# Patient Record
Sex: FEMALE | URBAN - METROPOLITAN AREA
[De-identification: names, ages, dates, MRNs, and addresses within clinical notes are randomized per-mention and may not be internally consistent; named-entity substitution may affect disease eponyms.]

---

## 2017-09-25 ENCOUNTER — OFFICE VISIT (OUTPATIENT)
Dept: PEDIATRICS | Facility: CLINIC | Age: 37
End: 2017-09-25
Payer: COMMERCIAL

## 2017-09-25 VITALS
DIASTOLIC BLOOD PRESSURE: 66 MMHG | HEIGHT: 65 IN | OXYGEN SATURATION: 98 % | TEMPERATURE: 98.1 F | WEIGHT: 131.2 LBS | SYSTOLIC BLOOD PRESSURE: 108 MMHG | BODY MASS INDEX: 21.86 KG/M2 | HEART RATE: 100 BPM

## 2017-09-25 DIAGNOSIS — Z11.3 ROUTINE SCREENING FOR STI (SEXUALLY TRANSMITTED INFECTION): ICD-10-CM

## 2017-09-25 DIAGNOSIS — Z72.0 TOBACCO ABUSE: ICD-10-CM

## 2017-09-25 DIAGNOSIS — N92.0 SPOTTING: ICD-10-CM

## 2017-09-25 DIAGNOSIS — Z00.00 HEALTH CARE MAINTENANCE: Primary | ICD-10-CM

## 2017-09-25 LAB
SPECIMEN SOURCE: NORMAL
WET PREP SPEC: NORMAL

## 2017-09-25 PROCEDURE — 99385 PREV VISIT NEW AGE 18-39: CPT | Performed by: NURSE PRACTITIONER

## 2017-09-25 PROCEDURE — 87210 SMEAR WET MOUNT SALINE/INK: CPT | Performed by: NURSE PRACTITIONER

## 2017-09-25 PROCEDURE — 99213 OFFICE O/P EST LOW 20 MIN: CPT | Mod: 25 | Performed by: NURSE PRACTITIONER

## 2017-09-25 PROCEDURE — G0476 HPV COMBO ASSAY CA SCREEN: HCPCS | Performed by: NURSE PRACTITIONER

## 2017-09-25 PROCEDURE — G0145 SCR C/V CYTO,THINLAYER,RESCR: HCPCS | Performed by: NURSE PRACTITIONER

## 2017-09-25 PROCEDURE — G0124 SCREEN C/V THIN LAYER BY MD: HCPCS | Performed by: NURSE PRACTITIONER

## 2017-09-25 PROCEDURE — 87491 CHLMYD TRACH DNA AMP PROBE: CPT | Performed by: NURSE PRACTITIONER

## 2017-09-25 PROCEDURE — 87591 N.GONORRHOEAE DNA AMP PROB: CPT | Performed by: NURSE PRACTITIONER

## 2017-09-25 NOTE — NURSING NOTE
"Chief Complaint   Patient presents with     Physical     Establish Care       Initial /66  Pulse 100  Temp 98.1  F (36.7  C) (Tympanic)  Ht 5' 5.25\" (1.657 m)  Wt 131 lb 3.2 oz (59.5 kg)  LMP 08/25/2017 (Exact Date)  SpO2 98%  BMI 21.67 kg/m2 Estimated body mass index is 21.67 kg/(m^2) as calculated from the following:    Height as of this encounter: 5' 5.25\" (1.657 m).    Weight as of this encounter: 131 lb 3.2 oz (59.5 kg).  Medication Reconciliation: complete   Karen Covarrubias CMA    "

## 2017-09-25 NOTE — LETTER
October 2, 2017    Kar Bell  [address]   [place] [address]    Dear ,      As we had discussed by phone, enclosed are the results of your recent pap smear and HPV testing, along with the recommendations.     We have recently received your PAP smear result. The result shows ASCUS or Atypical Squamous Cells of Undetermined Significance. This indicates a mild change only    We also tested your sample for the presence of the HPV (Human Papillomavirus). Your sample was positive for HPV types 16 and another high risk type. There are many types of HPV, but we test pap samples for the high risk types. HPV can be the cause of an abnormal Pap smear result.  The high risk types of HPV can also be related to the potential development of cervical cancer if not monitored and/or treated appropriately    Because of this, we need to do further testing. It is recommended that you schedule a colposcopy. Colposcopy is a way for your doctor to use a special magnifying device to look at your vulva, vagina, and cervix. If a problem is seen during colposcopy, a small sample of tissue may be collected for laboratory testing (biopsy). The exam and test will help determine the reason for your abnormal pap smear.    Please call the [facility] at [phone] to schedule this procedure. Schedule this for a time when you are not due to have a period (if having regular menstrual bleeding). You can take an over the counter pain reliever 1 hour before your colposcopy. Nothing in the vagina for 24 hours before your colposcopy (no sex, douches, vaginal medications or lubricants).     If you have additional questions regarding this result, please call the Pap nurse, Mae HAYDEN, at [phone].     Sincerely,  Edna Bernardo, CINTHIA CALERO/Lynette Nissen RN

## 2017-09-25 NOTE — MR AVS SNAPSHOT
After Visit Summary   9/25/2017    Kar Bell    MRN: [id number]           Patient Information     Date Of Birth          1980        Visit Information        Provider Department      9/25/2017 8:20 AM Edna Bernardo APRN [facility]        Today's [facility]     Health care maintenance    -  1    Routine screening for STI (sexually transmitted infection)          Care Instructions    -Come back for blood work on Friday. Schedule appointment.      Preventive Health Recommendations  Female Ages 26 - 39  Yearly exam:   See your health care provider every year in order to    Review health changes.     Discuss preventive care.      Review your medicines if you your doctor has prescribed any.    Until age 30: Get a Pap test every three years (more often if you have had an abnormal result).    After age 30: Talk to your doctor about whether you should have a Pap test every 3 years or have a Pap test with HPV screening every 5 years.   You do not need a Pap test if your uterus was removed (hysterectomy) and you have not had cancer.  You should be tested each year for STDs (sexually transmitted diseases), if you're at risk.   Talk to your provider about how often to have your cholesterol checked.  If you are at risk for diabetes, you should have a diabetes test (fasting glucose).  Shots: Get a flu shot each year. Get a tetanus shot every 10 years.   Nutrition:     Eat at least 5 servings of fruits and vegetables each day.    Eat whole-grain bread, whole-wheat pasta and brown rice instead of white grains and rice.    Talk to your provider about Calcium and Vitamin D.     Lifestyle    Exercise at least 150 minutes a week (30 minutes a day, 5 days of the week). This will help you control your weight and prevent disease.    Limit alcohol to one drink per day.    No smoking.     Wear sunscreen to prevent skin cancer.    See your dentist every six months for an exam and cleaning.          "   Follow-ups after your visit        Future tests that were ordered for you today     Open Future Orders        Priority Expected Expires Ordered    Lipid panel reflex to direct LDL Routine  2018            Who to contact     If you have questions or need follow up information about today's clinic visit or your schedule please contact [facility] [place] directly at [phone].  Normal or non-critical lab and imaging results will be communicated to you by MyChart, letter or phone within 4 business days after the clinic has received the results. If you do not hear from us within 7 days, please contact the clinic through [facility] or phone. If you have a critical or abnormal lab result, we will notify you by phone as soon as possible.  Submit refill requests through [facility] or call your pharmacy and they will forward the refill request to us. Please allow 3 business days for your refill to be completed.          Additional Information About Your Visit        [id number] Information     [facility] lets you send messages to your doctor, view your test results, renew your prescriptions, schedule appointments and more. To sign up, go to [email or web address] . Click on \"Log in\" on the left side of the screen, which will take you to the Welcome page. Then click on \"Sign up Now\" on the right side of the page.     You will be asked to enter the access code listed below, as well as some personal information. Please follow the directions to create your username and password.     Your access code is: [email or web address]  Expires: 2017  8:50 AM     Your access code will  in 90 days. If you need help or a new code, please call your [facility] clinic or [phone].        Care EveryWhere ID     This is your Care EveryWhere ID. This could be used by other organizations to access your [facility] medical records  [id number]        Your Vitals Were     Pulse Temperature Height Last Period Pulse Oximetry BMI (Body " "Mass Index)    100 98.1  F (36.7  C) (Tympanic) 5' 5.25\" (1.657 m) 08/25/2017 (Exact Date) 98% 21.67 kg/m2       Blood Pressure from Last 3 Encounters:   09/25/17 108/66    Weight from Last 3 Encounters:   09/25/17 131 lb 3.2 oz (59.5 kg)              We Performed the Following     CHLAMYDIA TRACHOMATIS PCR     HPV High Risk Types DNA Cervical     NEISSERIA GONORRHOEA PCR     Pap imaged thin layer screen with HPV - recommended age 30 - 65     Wet prep        Primary Care Provider Office Phone # Fax #    Edna Bernardo, CINTHIA [facility] [phone] [address] [place] [address]        Equal Access to Services     ALEC RDZ : Hadii aad ku hadasho Soomaali, waaxda luqadaha, qaybta kaalmada adeegyada, shan mace . So [facility] [phone].    ATENCIÓN: Si habla español, tiene a britton disposición servicios gratuitos de asistencia lingüística. Llame al [phone].    We comply with applicable federal civil rights laws and [place] laws. We do not discriminate on the basis of race, color, national origin, age, disability sex, sexual orientation or gender identity.            Thank you!     Thank you for choosing [facility]  for your care. Our goal is always to provide you with excellent care. Hearing back from our patients is one way we can continue to improve our services. Please take a few minutes to complete the written survey that you may receive in the mail after your visit with us. Thank you!             Your Updated Medication List - Protect others around you: Learn how to safely use, store and throw away your medicines at www.disposemymeds.org.          This list is accurate as of: 9/25/17  8:50 AM.  Always use your most recent med list.                   Brand Name Dispense Instructions for use Diagnosis    BIOTIN PO       Health care maintenance       FOLIC ACID PO      Take 1 mg by mouth daily    Health care maintenance       UNABLE TO " FIND      MEDICATION NAME: fucidin cream 2% - (antibiotic)    Health care maintenance       VITAMIN D PO       Health care maintenance       ALISHA 28 PO       Health care maintenance       ZINC MAGNESIUM ASPARTATE PO       Health care maintenance

## 2017-09-25 NOTE — PROGRESS NOTES
SUBJECTIVE:   CC: Kar Bell is an 37 year old woman who presents for preventive health visit.     Physical   Annual:     Getting at least 3 servings of Calcium per day::  Yes    Bi-annual eye exam::  Yes    Dental care twice a year::  NO    Sleep apnea or symptoms of sleep apnea::  None    Diet::  Regular (no restrictions)    Frequency of exercise::  1 day/week    Duration of exercise::  15-30 minutes    Taking medications regularly::  Yes    Medication side effects::  Not applicable    Additional concerns today::  No  Just quit smoking 3 days ago - smoking since 1996, stopped fpr 2 years when pregnant & breastfeeding  Just broke up with boyfriend, was having spotting for a couple days after intercourse    Previously she tolerated 0.5mg Chantix and it worked, but 1mg gave her dizzy side effects. 0.5 worked in the past. Patch and gum didn't work.     Today's PHQ-2 Score:   PHQ-2 ( 1999 Pfizer) 9/25/2017   Q1: Little interest or pleasure in doing things 1   Q2: Feeling down, depressed or hopeless 1   PHQ-2 Score 2   Q1: Little interest or pleasure in doing things Several days   Q2: Feeling down, depressed or hopeless Several days   PHQ-2 Score 2   Answers for HPI/ROS submitted by the patient on 9/25/2017   PHQ-2 Score: 2    Abuse: Current or Past(Physical, Sexual or Emotional)- No  Do you feel safe in your environment - Yes    Social History   Substance Use Topics     Smoking status: Former Smoker     Smokeless tobacco: Former User     Quit date: 9/22/2017     Alcohol use Yes      Comment: daily - 1 beer     The patient does not drink >3 drinks per day nor >7 drinks per week.    Reviewed orders with patient.  Reviewed health maintenance and updated orders accordingly - Yes  Labs reviewed in EPIC    Mammogram not appropriate for this patient based on age.    Pertinent mammograms are reviewed under the imaging tab.  History of abnormal Pap smear: NO - age 30-65 PAP every 5 years with negative HPV co-testing  "recommended    Reviewed and updated as needed this visit by clinical staff  Tobacco  Meds  Med Hx  Surg Hx  Fam Hx  Soc Hx        Reviewed and updated as needed this visit by Provider              ROS:  C: NEGATIVE for fever, chills, change in weight  I: NEGATIVE for worrisome rashes, moles or lesions  E: NEGATIVE for vision changes or irritation  ENT: NEGATIVE for ear, mouth and throat problems  R: NEGATIVE for significant cough or SOB  B: NEGATIVE for masses, tenderness or discharge  CV: NEGATIVE for chest pain, palpitations or peripheral edema  GI: NEGATIVE for nausea, abdominal pain, heartburn, or change in bowel habits  : NEGATIVE for unusual urinary or vaginal symptoms. Periods are regular.  M: NEGATIVE for significant arthralgias or myalgia  N: NEGATIVE for weakness, dizziness or paresthesias  P: NEGATIVE for changes in mood or affect     OBJECTIVE:   /66  Pulse 100  Temp 98.1  F (36.7  C) (Tympanic)  Ht 5' 5.25\" (1.657 m)  Wt 131 lb 3.2 oz (59.5 kg)  LMP 08/25/2017 (Exact Date)  SpO2 98%  BMI 21.67 kg/m2  EXAM:  GENERAL: healthy, alert and no distress  EYES: Eyes grossly normal to inspection, PERRL and conjunctivae and sclerae normal  HENT: ear canals and TM's normal, nose and mouth without ulcers or lesions  NECK: no adenopathy, no asymmetry, masses, or scars and thyroid normal to palpation  RESP: lungs clear to auscultation - no rales, rhonchi or wheezes  BREAST: normal without masses, tenderness or nipple discharge and no palpable axillary masses or adenopathy  CV: regular rate and rhythm, normal S1 S2, no S3 or S4, no murmur, click or rub, no peripheral edema and peripheral pulses strong  ABDOMEN: soft, nontender, no hepatosplenomegaly, no masses and bowel sounds normal   (female): normal female external genitalia, normal urethral meatus, vaginal mucosa pink, moist, well rugated, and normal cervix/adnexa/uterus without masses or discharge  MS: no gross musculoskeletal defects noted, " "no edema  SKIN: hyperpigmented skin under breasts. no suspicious lesions or rashes  NEURO: Normal strength and tone, mentation intact and speech normal  PSYCH: mentation appears normal, affect normal/bright    ASSESSMENT/PLAN:   1. Health care maintenance  - Pap imaged thin layer screen with HPV - recommended age 30 - 65  - HPV High Risk Types DNA Cervical  - Drospirenone-Ethinyl Estradiol (ALISHA 28 PO);   - UNABLE TO FIND; MEDICATION NAME: fucidin cream 2% - (antibiotic)  - BIOTIN PO;   - Zinc-Magnesium Aspart-Vit B6 (ZINC MAGNESIUM ASPARTATE PO);   - FOLIC ACID PO; Take 1 mg by mouth daily  - Cholecalciferol (VITAMIN D PO);   - Wet prep  - Lipid panel reflex to direct LDL; Future    2. Routine screening for STI (sexually transmitted infection)  - NEISSERIA GONORRHOEA PCR  - CHLAMYDIA TRACHOMATIS PCR    3. Tobacco abuse  Previously tolerated Chantix. Wants to stay at 0.5mg total, had s/es with higher doses,  - varenicline (CHANTIX STARTING MONTH PAK) 0.5 MG X 11 & 1 MG X 42 tablet; Take 0.5 tablets by mouth daily  Dispense: 60 tablet; Refill: 2    (N92.0) Spotting  Comment: No sx of vag infection or STI. Will test for those things as they could cause spotting.   Plan: Wet prep, GONORRHEA/chlamydia.         COUNSELING:  Reviewed preventive health counseling, as reflected in patient instructions         reports that she has quit smoking. She quit smokeless tobacco use 3 days ago.    Estimated body mass index is 21.67 kg/(m^2) as calculated from the following:    Height as of this encounter: 5' 5.25\" (1.657 m).    Weight as of this encounter: 131 lb 3.2 oz (59.5 kg).         Counseling Resources:  ATP IV Guidelines  Pooled Cohorts Equation Calculator  Breast Cancer Risk Calculator  FRAX Risk Assessment  ICSI Preventive Guidelines  Dietary Guidelines for Americans, 2010  USDA's MyPlate  ASA Prophylaxis  Lung CA Screening    CINTHIA Sapp [facility] [place]  "

## 2017-09-25 NOTE — PATIENT INSTRUCTIONS
-Come back for blood work on Friday. Schedule appointment.      Preventive Health Recommendations  Female Ages 26 - 39  Yearly exam:   See your health care provider every year in order to    Review health changes.     Discuss preventive care.      Review your medicines if you your doctor has prescribed any.    Until age 30: Get a Pap test every three years (more often if you have had an abnormal result).    After age 30: Talk to your doctor about whether you should have a Pap test every 3 years or have a Pap test with HPV screening every 5 years.   You do not need a Pap test if your uterus was removed (hysterectomy) and you have not had cancer.  You should be tested each year for STDs (sexually transmitted diseases), if you're at risk.   Talk to your provider about how often to have your cholesterol checked.  If you are at risk for diabetes, you should have a diabetes test (fasting glucose).  Shots: Get a flu shot each year. Get a tetanus shot every 10 years.   Nutrition:     Eat at least 5 servings of fruits and vegetables each day.    Eat whole-grain bread, whole-wheat pasta and brown rice instead of white grains and rice.    Talk to your provider about Calcium and Vitamin D.     Lifestyle    Exercise at least 150 minutes a week (30 minutes a day, 5 days of the week). This will help you control your weight and prevent disease.    Limit alcohol to one drink per day.    No smoking.     Wear sunscreen to prevent skin cancer.    See your dentist every six months for an exam and cleaning.

## 2017-09-26 LAB
C TRACH DNA SPEC QL NAA+PROBE: NEGATIVE
N GONORRHOEA DNA SPEC QL NAA+PROBE: NEGATIVE
SPECIMEN SOURCE: NORMAL
SPECIMEN SOURCE: NORMAL

## 2017-09-27 LAB
COPATH REPORT: ABNORMAL
PAP: ABNORMAL

## 2017-09-28 LAB
FINAL DIAGNOSIS: ABNORMAL
HPV HR 12 DNA CVX QL NAA+PROBE: POSITIVE
HPV16 DNA SPEC QL NAA+PROBE: POSITIVE
HPV18 DNA SPEC QL NAA+PROBE: NEGATIVE
SPECIMEN DESCRIPTION: ABNORMAL

## 2017-09-29 DIAGNOSIS — Z00.00 HEALTH CARE MAINTENANCE: ICD-10-CM

## 2017-09-29 LAB
CHOLEST SERPL-MCNC: 265 MG/DL
HDLC SERPL-MCNC: 88 MG/DL
LDLC SERPL CALC-MCNC: 151 MG/DL
NONHDLC SERPL-MCNC: 177 MG/DL
TRIGL SERPL-MCNC: 128 MG/DL

## 2017-09-29 PROCEDURE — 80061 LIPID PANEL: CPT | Performed by: NURSE PRACTITIONER

## 2017-09-29 PROCEDURE — 36415 COLL VENOUS BLD VENIPUNCTURE: CPT | Performed by: NURSE PRACTITIONER

## 2017-10-02 ENCOUNTER — RESULT FOLLOW UP (OUTPATIENT)
Dept: PEDIATRICS | Facility: CLINIC | Age: 37
End: 2017-10-02

## 2017-10-02 DIAGNOSIS — R87.613 HIGH GRADE SQUAMOUS INTRAEPITHELIAL CERVICAL DYSPLASIA: ICD-10-CM

## 2017-10-02 DIAGNOSIS — R87.810 ASCUS WITH POSITIVE HIGH RISK HPV CERVICAL: ICD-10-CM

## 2017-10-02 DIAGNOSIS — R87.610 ASCUS WITH POSITIVE HIGH RISK HPV CERVICAL: ICD-10-CM

## 2017-10-02 NOTE — LETTER
January 3, 2019      Rollyambar Santiago  [address] [address]    Dear ,      At [facility], your health and wellness is our primary concern. That is why we are following up on a positive high risk HPV test from 11/02/18. Your provider had recommended that you have a Colposcopy  completed by 02/02/19. Our records do not show that this has been scheduled.    It is important to complete the follow up that your provider has suggested for you to ensure that there are no worsening changes which may, over time, develop into cancer.      Please contact our office at  [phone] to schedule an appointment for a Colposcopy (this cannot be scheduled through [facility]) at your earliest convenience. If you have questions or concerns, please call the clinic and we will be happy to assist you.    If you have completed the tests outside of [facility], please have the results forwarded to our office. We will update the chart for your primary Physician to review before your next annual physical.     Thank you for choosing [facility]!    Sincerely,      Edna Bernardo, CINTHIA CNP/esh

## 2017-10-02 NOTE — LETTER
April 19, 2019      Kar Santiago  [address] [address] [address]    Dear MsChasityJack,      At [facility], your health and wellness is our primary concern. That is why we are following up on a positive high risk HPV test from 11/02/18. Your provider had recommended that you have a Colposcopy  completed by 02/02/19. Our records do not show that this has been done or scheduled.      If you have chosen not to do the recommended colposcopy, please contact our office at [phone] to schedule an appointment for a repeat PAP smear and HPV test at your earliest convenience.    If you have completed the tests outside of [facility], please have the results forwarded to our office. We will update the chart for your primary Physician to review before your next annual physical.     Thank you for choosing [facility]!    Sincerely,      Your [facility] Care Team/[facility]

## 2017-10-02 NOTE — LETTER
April 19, 2019      Kar Santiago  [address] [address] [address]    Dear ,    We spoke on the phone today and I could hear that you were emotional. I wanted to let you know that I am sorry for what is troubling you.    I also wanted to follow up with you to give you the [facility] program information, as you had mentioned that you are not able to afford the colposcopy at this time because you are without insurance. This program financially assists women in need of Pap smears, colposcopies, mammograms, etc. who are underinsured or without insurance. I have enclosed one of their brochures for you.    If you have any further needs or questions, please don't hesitate to contact us. Thank you for your time.      Sincerely,    Angie Mandujano  Pap Tracking  - [phone]

## 2017-10-02 NOTE — PROGRESS NOTES
"9/25/17 ASCUS, +HPV 16 & other HR type. Plan colp by 12/25/17  10/2/17 Patient notified of results/recommendations. See result notes.   10/03/17 Result letter printed and sent at request of RN. 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 Plan: pap in 3 months. If still LSIL, then recommend colpo.   02/13/18 Pt notified ([facility])  4/27/18 Dx NIL pap, + HR HPV (not 16/18). ECC: ASCUS. Plan cotest in 6 months ([facility]/lks)  11/2/18 Dx NIL pap, + HR HPV (not 16/18). There are 2 results listed for pap/HPV - Plan colp ([facility])  11/12/18 Pt notified - she is hesitant to follow up with colp again. Wants \"permanent solution\". Dr. Becerra agrees with colp as next step then may discuss further ([facility]) // 11/19/18 LM for pt to call back and touch base about her thoughts ([facility]) // 11/21/18 Spoke with pt. She agrees to proceed with colp ([facility])  01/03/19 [place] reminder letter sent. ([facility])  02/04/19 3mo colp not done, chart updated for 6mo colp/pap. Routed to RN. ([facility])  02/05/19 6 month FYI sent to provider ([facility])  04/19/19 [place]/pap reminder letter sent. ([facility])  05/10/19 Spoke with pt, states she does not currently have insurance and cannot afford the colposcopy. Before I could offer the option of the repeat cotest and/or [facility] program info, pt started to cry and stated it was not a good time to talk. I sent a letter with [facility] program brochure enclosed. ([facility])  06/07/19 Patient is lost to pap tracking follow-up. FYI routed to provider. ([facility])    "

## 2017-10-04 ENCOUNTER — OFFICE VISIT (OUTPATIENT)
Dept: OBGYN | Facility: CLINIC | Age: 37
End: 2017-10-04
Payer: COMMERCIAL

## 2017-10-04 VITALS — DIASTOLIC BLOOD PRESSURE: 68 MMHG | SYSTOLIC BLOOD PRESSURE: 112 MMHG | HEART RATE: 84 BPM

## 2017-10-04 DIAGNOSIS — R87.610 ASCUS WITH POSITIVE HIGH RISK HPV CERVICAL: Primary | ICD-10-CM

## 2017-10-04 DIAGNOSIS — R87.810 ASCUS WITH POSITIVE HIGH RISK HPV CERVICAL: Primary | ICD-10-CM

## 2017-10-04 PROCEDURE — 88305 TISSUE EXAM BY PATHOLOGIST: CPT | Mod: 59 | Performed by: OBSTETRICS & GYNECOLOGY

## 2017-10-04 PROCEDURE — 00000155 ZZHCL STATISTIC H-CELL BLOCK W/STAIN: Performed by: OBSTETRICS & GYNECOLOGY

## 2017-10-04 PROCEDURE — 88112 CYTOPATH CELL ENHANCE TECH: CPT | Performed by: OBSTETRICS & GYNECOLOGY

## 2017-10-04 PROCEDURE — 88305 TISSUE EXAM BY PATHOLOGIST: CPT | Performed by: OBSTETRICS & GYNECOLOGY

## 2017-10-04 PROCEDURE — 57454 BX/CURETT OF CERVIX W/SCOPE: CPT | Performed by: OBSTETRICS & GYNECOLOGY

## 2017-10-04 NOTE — PROGRESS NOTES
Kar Bell is a 37 year old female who presents for initial colposcopy, referred by Dr. Bernardo. Pap smear shows ASCUS with HR HPV      -no previous colposcopy or treatment for dysplasia    Past Medical History:   Diagnosis Date     ASCUS with positive high risk HPV cervical 09/25/2017    ASCUS, +HPV 16 & other HR type     Family History   Problem Relation Age of Onset     Other Cancer Father      started with kidney then spread everywhere     DIABETES Father      Coronary Artery Disease Father      Hypertension Father      Hyperlipidemia Father      Coronary Artery Disease Maternal Grandfather      Breast Cancer Paternal Grandmother        Previous history of abnormal paps?: No  History of cryotherapy (freezing)?: : No  History of LEEP: : No     Patient's last menstrual period was 09/26/2017 (exact date).      History   Smoking Status     Former Smoker   Smokeless Tobacco     Former User     Quit date: 9/22/2017       Allergies as of 10/04/2017     (No Known Allergies)       PROCEDURE:  Before the procedure, it was ensured that the patient was educated regarding the nature of her findings to date, the implications of them, and what was to be done. She has been made aware of the role of HPV, the natural history of infection, ways to minimize her future risk, the effect of HPV on the cervix, and treatment options available should they be indicated. The pathophysiology of the cervix, including a discussion of squamous vs. endometrial cells, and squamous metaplasia have all been reviewed, using illustrations and sketches. The details of the colposcopic procedure were reviewed, as well as the risks of missed diagnoses, pain, infection and bleeding. All questions were answered before proceeding, and informed consent was therefore obtained.      Pap repeated?:  No  SCJ seen?:  yes  Endocervical speculum needed?:  No  ECC done?:  No  EndoPap done?:  YES  Lugol's solution used?:  No  Satisfactory examination?:   yes    Vaginal vault: normal to cursory inspection   Urethra normal?:  yes  Labia normal?:  yes  Perineum normal?:  yes  Rectum normal?:  yes    FINDINGS:  Please see image   Cervix: acetowhite area from 5:00 to 7:00  Procedure: biopsies taken (not including ECC): 1.    Procedure Note:     Lighted speculum used to visualize cervix     Cervix painted with vinegar     Aceto-white area noted from 5 to 7 o'clock        -endoPap done        -biopsy of cervix with Kevorkian biopsy forceps done at 6 o'clock     Nacho singh; applied to biopsy site  All instruments removed from vagina    Assessment: atypia vs LGSIL      Plan: await biopsy(s)

## 2017-10-04 NOTE — NURSING NOTE
"  Chief Complaint   Patient presents with     Colposcopy     last pap 09/25/17 - ascus - HPV HR and 16 DNA       Initial /68 (BP Location: Right arm, Patient Position: Chair, Cuff Size: Adult Regular)  Pulse 84  LMP 09/26/2017 (Exact Date) Estimated body mass index is 21.67 kg/(m^2) as calculated from the following:    Height as of 9/25/17: 5' 5.25\" (1.657 m).    Weight as of 9/25/17: 131 lb 3.2 oz (59.5 kg).  Medication Reconciliation: complete     Nurse assisted visit.  Kimberlyn Jameson MA.      "

## 2017-10-04 NOTE — LETTER
[facility]  [address]  [place] [address]  [phone]      October 9, 2017    Kar Bell                                                                                                                                                       [address]   [place] [address]              Dear Kar,          The results of your colposcopy show high-grade squamous intraepithelial lesion (IDA 2-3)  At this point I would recommend LEEP Therapy.  This is a simple office procedure that will usually get rid of the abnormality.      I have enclosed some literature regarding dysplasia and the LEEP procedure.    Your LEEP is scheduled at the [facility], [address], [address]  On 11/3/17 at 10:00.    It is recommended that you take Ibuprofen 600 mg 1 hour prior to your scheduled appointment time.  We will also need to collect a urine sample upon your arrival.    Feel free to call me or my nurse if you have any questions before your appointment.                    Sincerely,  Julian Becerra MD

## 2017-10-06 LAB
COPATH REPORT: NORMAL
COPATH REPORT: NORMAL

## 2017-10-08 NOTE — PROGRESS NOTES
Please contact the patient and asked her to follow-up with Dr. Becerra regarding treatment for this result (ECC pos for CIN1)  Rush Barksdale M.D.

## 2017-11-03 ENCOUNTER — OFFICE VISIT (OUTPATIENT)
Dept: OBGYN | Facility: CLINIC | Age: 37
End: 2017-11-03

## 2017-11-03 VITALS — BODY MASS INDEX: 22.48 KG/M2 | SYSTOLIC BLOOD PRESSURE: 122 MMHG | DIASTOLIC BLOOD PRESSURE: 82 MMHG | WEIGHT: 136.1 LBS

## 2017-11-03 DIAGNOSIS — R87.613 HIGH GRADE SQUAMOUS INTRAEPITHELIAL CERVICAL DYSPLASIA: Primary | ICD-10-CM

## 2017-11-03 PROBLEM — R87.610 ASCUS WITH POSITIVE HIGH RISK HPV CERVICAL: Status: RESOLVED | Noted: 2017-09-25 | Resolved: 2017-11-03

## 2017-11-03 PROBLEM — R87.810 ASCUS WITH POSITIVE HIGH RISK HPV CERVICAL: Status: RESOLVED | Noted: 2017-09-25 | Resolved: 2017-11-03

## 2017-11-03 LAB — HCG, QUAL URINE: NEGATIVE

## 2017-11-03 PROCEDURE — 00000155 ZZHCL STATISTIC H-CELL BLOCK W/STAIN: Performed by: OBSTETRICS & GYNECOLOGY

## 2017-11-03 PROCEDURE — 88112 CYTOPATH CELL ENHANCE TECH: CPT | Performed by: OBSTETRICS & GYNECOLOGY

## 2017-11-03 PROCEDURE — 88342 IMHCHEM/IMCYTCHM 1ST ANTB: CPT | Performed by: OBSTETRICS & GYNECOLOGY

## 2017-11-03 PROCEDURE — 88305 TISSUE EXAM BY PATHOLOGIST: CPT | Performed by: OBSTETRICS & GYNECOLOGY

## 2017-11-03 PROCEDURE — 84703 CHORIONIC GONADOTROPIN ASSAY: CPT | Performed by: OBSTETRICS & GYNECOLOGY

## 2017-11-03 PROCEDURE — 88307 TISSUE EXAM BY PATHOLOGIST: CPT | Performed by: OBSTETRICS & GYNECOLOGY

## 2017-11-03 PROCEDURE — 57520 CONIZATION OF CERVIX: CPT | Performed by: OBSTETRICS & GYNECOLOGY

## 2017-11-03 NOTE — NURSING NOTE
"Chief Complaint   Patient presents with     Devon Tavares MA      Initial /82 (BP Location: Left arm, Patient Position: Chair, Cuff Size: Adult Regular)  Wt 136 lb 1.6 oz (61.7 kg)  LMP 10/26/2017  BMI 22.48 kg/m2 Estimated body mass index is 22.48 kg/(m^2) as calculated from the following:    Height as of 9/25/17: 5' 5.25\" (1.657 m).    Weight as of this encounter: 136 lb 1.6 oz (61.7 kg).  Medication Reconciliation: complete    "

## 2017-11-03 NOTE — PROGRESS NOTES
SUBJECTIVE: Kra Bell is a 37 year old female single female. OB Patient's last menstrual period was 10/26/2017..  She is here for LEEP. The details of LEEP were reviewed, as well as the risks of pain, infection and bleeding. Risks also include risk of uterine perforation, PID and possible pregnancy and risk of failure to remove all abnormality and/or recurrence of condition.  All questions were answered before proceeding, and informed consent was therefore obtained.    No Known Allergies  Current Outpatient Prescriptions   Medication Sig Dispense Refill     UNABLE TO FIND MEDICATION NAME: fucidin cream 2% - (antibiotic)       BIOTIN PO        Zinc-Magnesium Aspart-Vit B6 (ZINC MAGNESIUM ASPARTATE PO)        FOLIC ACID PO Take 1 mg by mouth daily       Cholecalciferol (VITAMIN D PO)        varenicline (CHANTIX STARTING MONTH PAK) 0.5 MG X 11 & 1 MG X 42 tablet Take 0.5 tablets by mouth daily 60 tablet 2      Past Medical History:   Diagnosis Date     ASCUS with positive high risk HPV cervical 09/25/2017    ASCUS, +HPV 16 & other HR type     Family History   Problem Relation Age of Onset     Other Cancer Father      started with kidney then spread everywhere     DIABETES Father      Coronary Artery Disease Father      Hypertension Father      Hyperlipidemia Father      Coronary Artery Disease Maternal Grandfather      Breast Cancer Paternal Grandmother       Social History     Social History     Marital status: Single     Spouse name: N/A     Number of children: N/A     Years of education: N/A     Occupational History     Not on file.     Social History Main Topics     Smoking status: Former Smoker     Smokeless tobacco: Former User     Quit date: 9/22/2017     Alcohol use Yes      Comment: daily - 1 beer     Drug use: No     Sexual activity: Yes     Partners: Male     Birth control/ protection: Pill     Other Topics Concern     Not on file     Social History Narrative     Past Surgical History:   Procedure Laterality  Date      SECTION             EXAM:  /82 (BP Location: Left arm, Patient Position: Chair, Cuff Size: Adult Regular)  Wt 136 lb 1.6 oz (61.7 kg)  LMP 10/26/2017  BMI 22.48 kg/m2  Abdomen: soft, nontender, without hepatosplenomegaly or masses  : normal cervix, adnexae, and uterus without masses or discharge      ASSESSMENT/PLAN:  (R87.613) High grade squamous intraepithelial cervical dysplasia  (primary encounter diagnosis)  LEEP    Procedure:        cervix visualized with coated speculum       anesthetized with 15 cc 2% lidocaine with epinephrine       anterior superficial biopsy removed and submitted to pathology       posterior superficial biopsy removed and submitted to pathology       deep biopsy removed and submitted to pathology       endopap done       ball cautery to base       Monsel's solution to base      Follow up appointment in two weeks.

## 2017-11-07 LAB
COPATH REPORT: NORMAL
COPATH REPORT: NORMAL

## 2017-11-17 ENCOUNTER — OFFICE VISIT (OUTPATIENT)
Dept: OBGYN | Facility: CLINIC | Age: 37
End: 2017-11-17

## 2017-11-17 VITALS — WEIGHT: 135.3 LBS | BODY MASS INDEX: 22.34 KG/M2 | SYSTOLIC BLOOD PRESSURE: 98 MMHG | DIASTOLIC BLOOD PRESSURE: 60 MMHG

## 2017-11-17 DIAGNOSIS — Z98.890 POSTOPERATIVE STATE: Primary | ICD-10-CM

## 2017-11-17 PROCEDURE — 99024 POSTOP FOLLOW-UP VISIT: CPT | Performed by: OBSTETRICS & GYNECOLOGY

## 2017-11-17 NOTE — PROGRESS NOTES
Here for postop check post LEEP      -LEEP therapy two weeks ago      -no significant bleeding until 2 days ago  Menses due 11/22/17    Pathology reviewed     LGSIL, no HGSIL margins free    O: cervix;         No bleeding site    A: bleeding; suspect uterine  P: return in 3 months first pap

## 2017-11-17 NOTE — NURSING NOTE
"Chief Complaint   Patient presents with     RECHECK     LEEP   2 week follow up.  States bright red bleeding.  Kym Tavares MA      Initial BP 98/60 (BP Location: Right arm, Patient Position: Chair, Cuff Size: Adult Regular)  Wt 135 lb 4.8 oz (61.4 kg)  LMP 11/13/2017  BMI 22.34 kg/m2 Estimated body mass index is 22.34 kg/(m^2) as calculated from the following:    Height as of 9/25/17: 5' 5.25\" (1.657 m).    Weight as of this encounter: 135 lb 4.8 oz (61.4 kg).  Medication Reconciliation: complete    "

## 2017-11-17 NOTE — MR AVS SNAPSHOT
"              After Visit Summary   11/17/2017    Kar Bell    MRN: [id number]           Patient Information     Date Of Birth          1980        Visit Information        Provider Department      11/17/2017 3:15 PM Julian Becerra MD [facility]        Today's Diagnoses     Postoperative state    -  1       Follow-ups after your visit        Your next 10 appointments already scheduled     Feb 02, 2018  9:45 AM CST   Office Visit with Julian Becerra MD   [facility] ([facility])    [address]  [facility] [id number] [phone]           Bring a current list of meds and any records pertaining to this visit. For Physicals, please bring immunization records and any forms needing to be filled out. Please arrive 10 minutes early to complete paperwork.              Who to contact     If you have questions or need follow up information about today's clinic visit or your schedule please contact [facility] directly at [phone].  Normal or non-critical lab and imaging results will be communicated to you by Mail'Insidehart, letter or phone within 4 business days after the clinic has received the results. If you do not hear from us within 7 days, please contact the clinic through Smart[employer]oc[employer] or phone. If you have a critical or abnormal lab result, we will notify you by phone as soon as possible.  Submit refill requests through [employer] or call your pharmacy and they will forward the refill request to us. Please allow 3 business days for your refill to be completed.          Additional Information About Your Visit        Mail'Insidehart Information     [employer] lets you send messages to your doctor, view your test results, renew your prescriptions, schedule appointments and more. To sign up, go to [email or web address] . Click on \"Log in\" on the left side of the screen, which will take you to the Welcome page. Then click on \"Sign up Now\" on the " right side of the page.     You will be asked to enter the access code listed below, as well as some personal information. Please follow the directions to create your username and password.     Your access code is: [email or web address]  Expires: 2017  7:50 AM     Your access code will  in 90 days. If you need help or a new code, please call your [place] clinic or [phone].        Care EveryWhere ID     This is your Care EveryWhere ID. This could be used by other organizations to access your [place] medical records  [id number]        Your Vitals Were     Last Period BMI (Body Mass Index)                2017 22.34 kg/m2           Blood Pressure from Last 3 Encounters:   17 98/60   17 122/82   10/04/17 112/68    Weight from Last 3 Encounters:   17 135 lb 4.8 oz (61.4 kg)   17 136 lb 1.6 oz (61.7 kg)   17 131 lb 3.2 oz (59.5 kg)              Today, you had the following     No orders found for display       Primary Care Provider Office Phone # Fax #    Edna Anca Bernardo, CINTHIA [facility] [phone]       [address] [place] [address]        Equal Access to Services     [facility] AH: Hadii aad ku hadasho Soomaali, waaxda luqadaha, qaybta kaalmada adeegyada, waxay idiin hayaan jacob mace . So [facility] [phone].    ATENCIÓN: Si habla español, tiene a britton disposición servicios gratuitos de asistencia lingüística. Llame al [phone].    We comply with applicable federal civil rights laws and [place] laws. We do not discriminate on the basis of race, color, national origin, age, disability, sex, sexual orientation, or gender identity.            Thank you!     Thank you for choosing [facility]  for your care. Our goal is always to provide you with excellent care. Hearing back from our patients is one way we can continue to improve our services. Please take a few minutes to complete the written survey that you may receive  in the mail after your visit with us. Thank you!             Your Updated Medication List - Protect others around you: Learn how to safely use, store and throw away your medicines at www.disposemymeds.org.          This list is accurate as of: 11/17/17  4:00 PM.  Always use your most recent med list.                   Brand Name Dispense Instructions for use Diagnosis    BIOTIN PO       Health care maintenance       FOLIC ACID PO      Take 1 mg by mouth daily    Health care maintenance       UNABLE TO FIND      MEDICATION NAME: fucidin cream 2% - (antibiotic)    Health care maintenance       varenicline 0.5 MG X 11 & 1 MG X 42 tablet    CHANTIX STARTING MONTH RANDY    60 tablet    Take 0.5 tablets by mouth daily    Tobacco abuse       VITAMIN D PO       Health care maintenance       ZINC MAGNESIUM ASPARTATE PO       Health care maintenance

## 2018-02-02 ENCOUNTER — OFFICE VISIT (OUTPATIENT)
Dept: OBGYN | Facility: CLINIC | Age: 38
End: 2018-02-02

## 2018-02-02 VITALS — DIASTOLIC BLOOD PRESSURE: 70 MMHG | SYSTOLIC BLOOD PRESSURE: 108 MMHG | BODY MASS INDEX: 22.46 KG/M2 | WEIGHT: 136 LBS

## 2018-02-02 DIAGNOSIS — R87.613 HIGH GRADE SQUAMOUS INTRAEPITHELIAL CERVICAL DYSPLASIA: ICD-10-CM

## 2018-02-02 DIAGNOSIS — Z12.4 ENCOUNTER FOR SCREENING FOR CERVICAL CANCER: Primary | ICD-10-CM

## 2018-02-02 PROCEDURE — 88141 CYTOPATH C/V INTERPRET: CPT | Performed by: OBSTETRICS & GYNECOLOGY

## 2018-02-02 PROCEDURE — 88175 CYTOPATH C/V AUTO FLUID REDO: CPT | Performed by: OBSTETRICS & GYNECOLOGY

## 2018-02-02 PROCEDURE — 99213 OFFICE O/P EST LOW 20 MIN: CPT | Performed by: OBSTETRICS & GYNECOLOGY

## 2018-02-02 PROCEDURE — 87624 HPV HI-RISK TYP POOLED RSLT: CPT | Performed by: OBSTETRICS & GYNECOLOGY

## 2018-02-02 NOTE — MR AVS SNAPSHOT
"              After Visit Summary   2018    Kar Bell    MRN: [id number]           Patient Information     Date Of Birth          1980        Visit Information        Provider Department      2018 9:45 AM Julian Becerra MD [facility]        Today's Diagnoses     Encounter for screening for cervical cancer     -  1    High grade squamous intraepithelial cervical dysplasia           Follow-ups after your visit        Who to contact     If you have questions or need follow up information about today's clinic visit or your schedule please contact [facility] directly at [phone].  Normal or non-critical lab and imaging results will be communicated to you by [employer]hart, letter or phone within 4 business days after the clinic has received the results. If you do not hear from us within 7 days, please contact the clinic through [employer] or phone. If you have a critical or abnormal lab result, we will notify you by phone as soon as possible.  Submit refill requests through [employer] or call your pharmacy and they will forward the refill request to us. Please allow 3 business days for your refill to be completed.          Additional Information About Your Visit        MyChart Information     [employer] lets you send messages to your doctor, view your test results, renew your prescriptions, schedule appointments and more. To sign up, go to [email or web address] . Click on \"Log in\" on the left side of the screen, which will take you to the Welcome page. Then click on \"Sign up Now\" on the right side of the page.     You will be asked to enter the access code listed below, as well as some personal information. Please follow the directions to create your username and password.     Your access code is: [email or web address]  Expires: 5/3/2018 10:52 AM     Your access code will  in 90 days. If you need help or a new code, please call your [facility] or [phone].        Care " EveryWhere ID     This is your Care EveryWhere ID. This could be used by other organizations to access your [place] medical records  [id number]        Your Vitals Were     Last Period BMI (Body Mass Index)                01/10/2018 (Exact Date) 22.46 kg/m2           Blood Pressure from Last 3 Encounters:   02/02/18 108/70   11/17/17 98/60   11/03/17 122/82    Weight from Last 3 Encounters:   02/02/18 136 lb (61.7 kg)   11/17/17 135 lb 4.8 oz (61.4 kg)   11/03/17 136 lb 1.6 oz (61.7 kg)              We Performed the Following     HPV High Risk Types DNA Cervical     Pap imaged thin layer diagnostic with HPV (select HPV order below)          Today's Medication Changes          These changes are accurate as of 2/2/18 10:52 AM.  If you have any questions, ask your nurse or doctor.               Stop taking these medicines if you haven't already. Please contact your care team if you have questions.     UNABLE TO FIND   Stopped by:  Julian Becerra MD           varenicline 0.5 MG X 11 & 1 MG X 42 tablet   Commonly known as:  CHANTIX STARTING MONTH PAK   Stopped by:  Julian Becerra MD                    Primary Care Provider Office Phone # Fax #    CINTHIA Sapp [facility] [phone]       [address] [place] [address]        Equal Access to Services     [facility] AH: Hadii aad ku hadasho Soomaali, waaxda luqadaha, qaybta kaalmada shan anderson. So [facility] [phone].    ATENCIÓN: Si habla español, tiene a britton disposición servicios gratuitos de asistencia lingüística. Joaquin al [phone].    We comply with applicable federal civil rights laws and [place] laws. We do not discriminate on the basis of race, color, national origin, age, disability, sex, sexual orientation, or gender identity.            Thank you!     Thank you for choosing [facility]  for your care. Our goal is always to provide you with excellent care.  Hearing back from our patients is one way we can continue to improve our services. Please take a few minutes to complete the written survey that you may receive in the mail after your visit with us. Thank you!             Your Updated Medication List - Protect others around you: Learn how to safely use, store and throw away your medicines at www.disposemymeds.org.          This list is accurate as of 2/2/18 10:52 AM.  Always use your most recent med list.                   Brand Name Dispense Instructions for use Diagnosis    BIOTIN PO       Health care maintenance       FOLIC ACID PO      Take 1 mg by mouth daily    Health care maintenance       VITAMIN D PO       Health care maintenance       ZINC MAGNESIUM ASPARTATE PO       Health care maintenance

## 2018-02-02 NOTE — NURSING NOTE
"Chief Complaint   Patient presents with     Repeat Pap Smear     Leep done 11/3/17       Initial /70 (BP Location: Right arm, Patient Position: Chair, Cuff Size: Adult Regular)  Wt 136 lb (61.7 kg)  LMP 01/10/2018 (Exact Date)  BMI 22.46 kg/m2 Estimated body mass index is 22.46 kg/(m^2) as calculated from the following:    Height as of 9/25/17: 5' 5.25\" (1.657 m).    Weight as of this encounter: 136 lb (61.7 kg).  Medication Reconciliation: complete     Talita Epps CMA      "

## 2018-02-02 NOTE — PROGRESS NOTES
S:   Kar Bell presents today for a first pap smear s/p LEEP           LEEP therapy for HGSIL     O:   Abdomen:    Abdomen soft, non-tender. BS normal. No masses, organomegaly           Pelvic:  normal vagina and vulva, normal cervix without lesions or tenderness, uterus normal size anteverted, adenxa normal in size without tenderness, pap smear done, exam chaperoned by nurse         Pap smear with autocyte brush and reflex HPV typing done    A:   PAP SMEAR,   repeat after therapy        HGSIL; treated with LEEP    P:   The patient wll return in 6 months is this pap smear is normal,  or sooner as              needed.

## 2018-02-06 LAB
COPATH REPORT: ABNORMAL
PAP: ABNORMAL

## 2018-02-08 LAB
FINAL DIAGNOSIS: ABNORMAL
HPV HR 12 DNA CVX QL NAA+PROBE: POSITIVE
HPV16 DNA SPEC QL NAA+PROBE: NEGATIVE
HPV18 DNA SPEC QL NAA+PROBE: NEGATIVE
SPECIMEN DESCRIPTION: ABNORMAL
SPECIMEN SOURCE CVX/VAG CYTO: ABNORMAL

## 2018-02-09 PROBLEM — R87.613 HIGH GRADE SQUAMOUS INTRAEPITHELIAL CERVICAL DYSPLASIA: Status: ACTIVE | Noted: 2017-11-03

## 2018-04-02 ENCOUNTER — TELEPHONE (OUTPATIENT)
Dept: PEDIATRICS | Facility: CLINIC | Age: 38
End: 2018-04-02

## 2018-04-02 NOTE — TELEPHONE ENCOUNTER
Patient is calling because she wonders if she has been tested for herpes-and she has not.  She is asking to be tested for herpes.  She is not symptomatic.  However, her  has lesions on his penis and now in his mouth, and he thinks he may have herpes.  States he has never been tested in the past, and doesn't know about exposure, but thinks this is herpes.  Advised her that he should be seen while having an outbreak.    Is a serum test going to provide reliable data for patient?  Advised her not to have sexual intercourse while her partner is experiencing an outbreak.  Encouraged her to have her  schedule an appointment.  He doesn't have a primary provider.  She will discuss with him.  KEMAL Quach RN

## 2018-04-03 NOTE — TELEPHONE ENCOUNTER
Patient notified of information below  She will speak with her  and encourage him to schedule an appointment.  No further questions.  Will call back if any other questions or concerns.  KEMAL Quach RN

## 2018-04-27 ENCOUNTER — OFFICE VISIT (OUTPATIENT)
Dept: OBGYN | Facility: CLINIC | Age: 38
End: 2018-04-27

## 2018-04-27 VITALS — DIASTOLIC BLOOD PRESSURE: 68 MMHG | WEIGHT: 134 LBS | BODY MASS INDEX: 22.13 KG/M2 | SYSTOLIC BLOOD PRESSURE: 106 MMHG

## 2018-04-27 DIAGNOSIS — R87.613 HIGH GRADE SQUAMOUS INTRAEPITHELIAL CERVICAL DYSPLASIA: ICD-10-CM

## 2018-04-27 PROCEDURE — 00000155 ZZHCL STATISTIC H-CELL BLOCK W/STAIN: Performed by: OBSTETRICS & GYNECOLOGY

## 2018-04-27 PROCEDURE — 88175 CYTOPATH C/V AUTO FLUID REDO: CPT | Performed by: OBSTETRICS & GYNECOLOGY

## 2018-04-27 PROCEDURE — 88305 TISSUE EXAM BY PATHOLOGIST: CPT | Performed by: OBSTETRICS & GYNECOLOGY

## 2018-04-27 PROCEDURE — 87624 HPV HI-RISK TYP POOLED RSLT: CPT | Performed by: OBSTETRICS & GYNECOLOGY

## 2018-04-27 PROCEDURE — 88112 CYTOPATH CELL ENHANCE TECH: CPT | Performed by: OBSTETRICS & GYNECOLOGY

## 2018-04-27 PROCEDURE — 99213 OFFICE O/P EST LOW 20 MIN: CPT | Performed by: OBSTETRICS & GYNECOLOGY

## 2018-04-27 NOTE — NURSING NOTE
"Chief Complaint   Patient presents with     Gyn Exam     Diagnostic pap        Initial /68 (BP Location: Left arm, Patient Position: Sitting, Cuff Size: Adult Regular)  Wt 134 lb (60.8 kg)  LMP 2018 (Exact Date)  Breastfeeding? No  BMI 22.13 kg/m2 Estimated body mass index is 22.13 kg/(m^2) as calculated from the following:    Height as of 17: 5' 5.25\" (1.657 m).    Weight as of this encounter: 134 lb (60.8 kg).  BP completed using cuff size: regular        The following HM Due: pap smear    patient has appointment for today.  Jan Covarrubias CMA                 "

## 2018-04-27 NOTE — LETTER
May 4, 2018      Kar Santiago  [address] [place] [address]    Dear ,      This letter is in regards to the PAP smear and HPV (Human Papillomavirus) test you had done recently. Your PAP test result is normal, but your HPV (Human Papillomavirus) test was positive.     About 80 percent of women have been exposed to HPV virus throughout their lifetime. There is no medication for the treatment of HPV. Typically your own immune system gets rid of the virus before it does harm. HPV is spread by direct skin-to-skin contact, including sexual intercourse, oral sex, anal sex, or any other contact involving the genital area (example: hand to genital contact). It is not possible to become infected with HPV by touching an object, such as a toilet seat. Most people who are infected with HPV have no signs or symptoms.    Things that you can do to boost your immune system and help your body get rid of HPV: get plenty of rest, eat a well-balanced diet of healthy foods, and stop smoking.     Please return in 6 months to repeat your pap smear and HPV test.     If you have additional questions regarding this result, please call [phone].    Sincerely,      Julian Becrera MD/kayley

## 2018-04-27 NOTE — MR AVS SNAPSHOT
"              After Visit Summary   2018    Kar Santiago    MRN: [id number]           Patient Information     Date Of Birth          1980        Visit Information        Provider Department      2018 9:00 AM Julian Becerra MD [facility]        Today's Diagnoses     High grade squamous intraepithelial cervical dysplasia           Follow-ups after your visit        Who to contact     If you have questions or need follow up information about today's clinic visit or your schedule please contact [facility] directly at [phone].  Normal or non-critical lab and imaging results will be communicated to you by MyChart, letter or phone within 4 business days after the clinic has received the results. If you do not hear from us within 7 days, please contact the clinic through [employer] or phone. If you have a critical or abnormal lab result, we will notify you by phone as soon as possible.  Submit refill requests through [employer] or call your pharmacy and they will forward the refill request to us. Please allow 3 business days for your refill to be completed.          Additional Information About Your Visit        MyChart Information     [employer] lets you send messages to your doctor, view your test results, renew your prescriptions, schedule appointments and more. To sign up, go to [email or web address] . Click on \"Log in\" on the left side of the screen, which will take you to the Welcome page. Then click on \"Sign up Now\" on the right side of the page.     You will be asked to enter the access code listed below, as well as some personal information. Please follow the directions to create your username and password.     Your access code is: [email or web address]  Expires: 5/3/2018 11:52 AM     Your access code will  in 90 days. If you need help or a new code, please call your [facility] or [phone].        Care EveryWhere ID     This is your Care EveryWhere ID. This " could be used by other organizations to access your [place] medical records  [id number]        Your Vitals Were     Last Period Breastfeeding? BMI (Body Mass Index)             03/20/2018 (Exact Date) No 22.13 kg/m2          Blood Pressure from Last 3 Encounters:   04/27/18 106/68   02/02/18 108/70   11/17/17 98/60    Weight from Last 3 Encounters:   04/27/18 134 lb (60.8 kg)   02/02/18 136 lb (61.7 kg)   11/17/17 135 lb 4.8 oz (61.4 kg)              We Performed the Following     Cytology non gyn     HPV High Risk Types DNA Cervical     Pap imaged thin layer diagnostic with HPV (select HPV order below)        Primary Care Provider Office Phone # Fax #    CINTHIA Sapp [phone] [address] [place] [address]        Equal Access to Services     [facility]: Hadii aad ku hadasho Soomaali, waaxda luqadaha, qaybta kaalmada adeegyada, shan martinezin haykalpana mace . So [facility] [phone].    ATENCIÓN: Si habla español, tiene a britton disposición servicios gratuitos de asistencia lingüística. Llame al [phone].    We comply with applicable federal civil rights laws and [place] laws. We do not discriminate on the basis of race, color, national origin, age, disability, sex, sexual orientation, or gender identity.            Thank you!     Thank you for choosing [facility]  for your care. Our goal is always to provide you with excellent care. Hearing back from our patients is one way we can continue to improve our services. Please take a few minutes to complete the written survey that you may receive in the mail after your visit with us. Thank you!             Your Updated Medication List - Protect others around you: Learn how to safely use, store and throw away your medicines at www.disposemymeds.org.          This list is accurate as of 4/27/18  9:48 AM.  Always use your most recent med list.                   Brand Name Dispense  Instructions for use Diagnosis    BIOTIN PO       Health care maintenance       FOLIC ACID PO      Take 1 mg by mouth daily    Health care maintenance       garlic 150 MG Tabs tablet      Take 150 mg by mouth daily        VITAMIN D PO       Health care maintenance       ZINC MAGNESIUM ASPARTATE PO       Health care maintenance

## 2018-04-27 NOTE — PROGRESS NOTES
S:   Kar Santiago presents today for a repeat pap smear.            -underwent LEEP therapy for IDA 3; first pap LGSIL               O:   Abdomen:    Abdomen soft, non-tender. BS normal. No masses, organomegaly           Pelvic:  normal vagina and vulva, normal cervix without lesions or tenderness, uterus normal size anteverted, adenxa normal in size without tenderness, pap smear done, exam chaperoned by nurse         Pap smear (ectocervix); done         EndoPap; done    A:   PAP SMEAR,   repeat after therapy     P:   Will attempt to localize abnormality with above separate pap smear        Further therapy/monitoring based on above

## 2018-05-01 LAB
COPATH REPORT: NORMAL
COPATH REPORT: NORMAL
PAP: NORMAL

## 2018-05-02 NOTE — PROGRESS NOTES
Please advise the patient of endocervical curetting results showing atypical squamous cells of undetermined significance.  I would like the patient to follow-up with Dr. Becerra to develop further treatment plan  Rush Barksdale MD FACOG

## 2018-11-02 ENCOUNTER — OFFICE VISIT (OUTPATIENT)
Dept: OBGYN | Facility: CLINIC | Age: 38
End: 2018-11-02

## 2018-11-02 VITALS — SYSTOLIC BLOOD PRESSURE: 118 MMHG | BODY MASS INDEX: 23.25 KG/M2 | WEIGHT: 140.8 LBS | DIASTOLIC BLOOD PRESSURE: 76 MMHG

## 2018-11-02 DIAGNOSIS — R87.613 HIGH GRADE SQUAMOUS INTRAEPITHELIAL CERVICAL DYSPLASIA: Primary | ICD-10-CM

## 2018-11-02 PROCEDURE — 99213 OFFICE O/P EST LOW 20 MIN: CPT | Performed by: OBSTETRICS & GYNECOLOGY

## 2018-11-02 PROCEDURE — 87624 HPV HI-RISK TYP POOLED RSLT: CPT | Performed by: OBSTETRICS & GYNECOLOGY

## 2018-11-02 PROCEDURE — 88175 CYTOPATH C/V AUTO FLUID REDO: CPT | Performed by: OBSTETRICS & GYNECOLOGY

## 2018-11-02 RX ORDER — DROSPIRENONE AND ETHINYL ESTRADIOL 0.03MG-3MG
1 KIT ORAL DAILY
COMMUNITY

## 2018-11-02 NOTE — PROGRESS NOTES
S:   Kar Santiago presents today for a repeat pap smear.  This the patient's second pap smear status   post LEEP .       O:   Abdomen:    Abdomen soft, non-tender. BS normal. No masses, organomegaly           Pelvic:  normal vagina and vulva, normal cervix without lesions or tenderness, uterus normal size anteverted, adenxa normal in size without tenderness, pap smear done, exam chaperoned by nurse         Pap smear:           -endoPap; done           -ectoPap; done    A:   PAP SMEAR,   repeat after therapy    P:   The patient wll return in 3 months is this pap smear is normal,  or sooner as              needed.

## 2018-11-02 NOTE — MR AVS SNAPSHOT
After Visit Summary   11/2/2018    Kar Santiago    MRN: [id number]           Patient Information     Date Of Birth          1980        Visit Information        Provider Department      11/2/2018 2:00 PM Julian Becerra MD [facility]        Today's Diagnoses     High grade squamous intraepithelial cervical dysplasia    -  1       Follow-ups after your visit        Who to contact     If you have questions or need follow up information about today's clinic visit or your schedule please contact [facility] directly at [phone].  Normal or non-critical lab and imaging results will be communicated to you by MyChart, letter or phone within 4 business days after the clinic has received the results. If you do not hear from us within 7 days, please contact the clinic through MyChart or phone. If you have a critical or abnormal lab result, we will notify you by phone as soon as possible.  Submit refill requests through [employer] or call your pharmacy and they will forward the refill request to us. Please allow 3 business days for your refill to be completed.          Additional Information About Your Visit        Care EveryWhere ID     This is your Care EveryWhere ID. This could be used by other organizations to access your [place] medical records  [id number]        Your Vitals Were     Last Period BMI (Body Mass Index)                10/17/2018 (Approximate) 23.25 kg/m2           Blood Pressure from Last 3 Encounters:   11/02/18 118/76   04/27/18 106/68   02/02/18 108/70    Weight from Last 3 Encounters:   11/02/18 140 lb 12.8 oz (63.9 kg)   04/27/18 134 lb (60.8 kg)   02/02/18 136 lb (61.7 kg)              Today, you had the following     No orders found for display       Primary Care Provider Office Phone # Fax #    CINTHIA Sapp [facility] [phone] [address] [place] [address]        Equal Access to Services     [facility]  GAAR : Hadii aad jeni cynthia Barahona, wachanoda luqadaha, qaybta kalevi bhavanachandujose, waxayala adriana menesesernstandre hernandez. So [facility] [phone].    ATENCIÓN: Si habla español, tiene a britton disposición servicios gratuitos de asistencia lingüística. Llame al [phone].    We comply with applicable federal civil rights laws and [place] laws. We do not discriminate on the basis of race, color, national origin, age, disability, sex, sexual orientation, or gender identity.            Thank you!     Thank you for choosing [facility]  for your care. Our goal is always to provide you with excellent care. Hearing back from our patients is one way we can continue to improve our services. Please take a few minutes to complete the written survey that you may receive in the mail after your visit with us. Thank you!             Your Updated Medication List - Protect others around you: Learn how to safely use, store and throw away your medicines at www.disposemymeds.org.          This list is accurate as of 11/2/18  2:07 PM.  Always use your most recent med list.                   Brand Name Dispense Instructions for use Diagnosis    BIOTIN PO       Health care maintenance       drospirenone-ethinyl estradiol 3-0.03 MG per tablet    ALISHA     Take 1 tablet by mouth daily        FOLIC ACID PO      Take 1 mg by mouth daily    Health care maintenance       garlic 150 MG Tabs tablet      Take 150 mg by mouth daily        VITAMIN D PO       Health care maintenance       ZINC MAGNESIUM ASPARTATE PO       Health care maintenance

## 2018-11-02 NOTE — NURSING NOTE
"No chief complaint on file.      Initial /76  Wt 140 lb 12.8 oz (63.9 kg)  LMP 10/17/2018 (Approximate)  BMI 23.25 kg/m2 Estimated body mass index is 23.25 kg/(m^2) as calculated from the following:    Height as of 17: 5' 5.25\" (1.657 m).    Weight as of this encounter: 140 lb 12.8 oz (63.9 kg).  BP completed using cuff size: regular    Questioned patient about current smoking habits.  Pt. quit smoking some time ago.          The following HM Due: pap smear      Trice Ward CMA                 "

## 2018-11-06 LAB
COPATH REPORT: NORMAL
COPATH REPORT: NORMAL
PAP: NORMAL
PAP: NORMAL

## 2018-11-07 PROBLEM — R87.810 CERVICAL HIGH RISK HPV (HUMAN PAPILLOMAVIRUS) TEST POSITIVE: Status: ACTIVE | Noted: 2018-02-02

## 2018-11-07 PROBLEM — R87.810 CERVICAL HIGH RISK HPV (HUMAN PAPILLOMAVIRUS) TEST POSITIVE: Status: ACTIVE | Noted: 2017-09-25

## 2018-11-07 LAB
FINAL DIAGNOSIS: ABNORMAL
FINAL DIAGNOSIS: ABNORMAL
HPV HR 12 DNA CVX QL NAA+PROBE: POSITIVE
HPV HR 12 DNA CVX QL NAA+PROBE: POSITIVE
HPV16 DNA SPEC QL NAA+PROBE: NEGATIVE
HPV16 DNA SPEC QL NAA+PROBE: NEGATIVE
HPV18 DNA SPEC QL NAA+PROBE: NEGATIVE
HPV18 DNA SPEC QL NAA+PROBE: NEGATIVE
SPECIMEN DESCRIPTION: ABNORMAL
SPECIMEN DESCRIPTION: ABNORMAL
SPECIMEN SOURCE CVX/VAG CYTO: ABNORMAL
SPECIMEN SOURCE CVX/VAG CYTO: ABNORMAL

## 2023-02-27 NOTE — MR AVS SNAPSHOT
"              After Visit Summary   11/3/2017    Kar Bell    MRN: [id number]           Patient Information     Date Of Birth          1980        Visit Information        Provider Department      11/3/2017 10:00 AM Julian Becerra MD; [facility]        Today's Diagnoses     High grade squamous intraepithelial cervical dysplasia    -  1       Follow-ups after your visit        Follow-up notes from your care team     Return in about 2 weeks (around 11/17/2017).      Your next 10 appointments already scheduled     Nov 10, 2017  9:40 AM CST   SHORT with CINTHIA Sapp CNP   [facility]    [address] [place] [address]  [address]  [employer] [address] [phone]              Who to contact     If you have questions or need follow up information about today's clinic visit or your schedule please contact [facility] directly at [phone].  Normal or non-critical lab and imaging results will be communicated to you by MyChart, letter or phone within 4 business days after the clinic has received the results. If you do not hear from us within 7 days, please contact the clinic through [employer] or phone. If you have a critical or abnormal lab result, we will notify you by phone as soon as possible.  Submit refill requests through [employer] or call your pharmacy and they will forward the refill request to us. Please allow 3 business days for your refill to be completed.          Additional Information About Your Visit        [employer] Information     [employer] lets you send messages to your doctor, view your test results, renew your prescriptions, schedule appointments and more. To sign up, go to [email or web address] . Click on \"Log in\" on the left side of the screen, which will take you to the Welcome page. Then click on \"Sign up Now\" on the right side of the page.     You will be asked to enter the access code listed " 2 bags in [place]  02/27/23 0316 below, as well as some personal information. Please follow the directions to create your username and password.     Your access code is: [email or web address]  Expires: 2017  8:50 AM     Your access code will  in 90 days. If you need help or a new code, please call your [place] clinic or [phone].        Care EveryWhere ID     This is your Care EveryWhere ID. This could be used by other organizations to access your [place] medical records  [id number]        Your Vitals Were     Last Period BMI (Body Mass Index)                10/26/2017 22.48 kg/m2           Blood Pressure from Last 3 Encounters:   17 122/82   10/04/17 112/68   17 108/66    Weight from Last 3 Encounters:   17 136 lb 1.6 oz (61.7 kg)   17 131 lb 3.2 oz (59.5 kg)              We Performed the Following     COLP CERVIX/UPPER VAGINA W LOOP ELEC BX CERVIX        Primary Care Provider Office Phone # Fax #    CINTHIA Sapp [facility] [phone] [address] [place] [address]        Equal Access to Services     [facility]: Hadii aad ku hadasho Soomaali, waaxda luqadaha, qaybta kaalmada adeegyada, waxay juanin hayhectorn jacob mace . So [facility] [phone].    ATENCIÓN: Si habla español, tiene a britton disposición servicios gratuitos de asistencia lingüística. [facility] [phone].    We comply with applicable federal civil rights laws and [place] laws. We do not discriminate on the basis of race, color, national origin, age, disability, sex, sexual orientation, or gender identity.            Thank you!     Thank you for choosing [facility]  for your care. Our goal is always to provide you with excellent care. Hearing back from our patients is one way we can continue to improve our services. Please take a few minutes to complete the written survey that you may receive in the mail after your visit with us. Thank you!             Your Updated Medication List -  Protect others around you: Learn how to safely use, store and throw away your medicines at www.disposemymeds.org.          This list is accurate as of: 11/3/17 10:26 AM.  Always use your most recent med list.                   Brand Name Dispense Instructions for use Diagnosis    BIOTIN PO       Health care maintenance       FOLIC ACID PO      Take 1 mg by mouth daily    Health care maintenance       UNABLE TO FIND      MEDICATION NAME: fucidin cream 2% - (antibiotic)    Health care maintenance       varenicline 0.5 MG X 11 & 1 MG X 42 tablet    CHANTIX STARTING MONTH RANDY    60 tablet    Take 0.5 tablets by mouth daily    Tobacco abuse       VITAMIN D PO       Health care maintenance       ZINC MAGNESIUM ASPARTATE PO       Health care maintenance